# Patient Record
Sex: FEMALE | URBAN - METROPOLITAN AREA
[De-identification: names, ages, dates, MRNs, and addresses within clinical notes are randomized per-mention and may not be internally consistent; named-entity substitution may affect disease eponyms.]

---

## 2023-03-04 ENCOUNTER — OFFICE VISIT (OUTPATIENT)
Dept: URBAN - METROPOLITAN AREA CLINIC 17 | Facility: CLINIC | Age: 58
End: 2023-03-04
Payer: COMMERCIAL

## 2023-03-04 DIAGNOSIS — E11.9 TYPE 2 DIABETES MELLITUS W/O COMPLICATION: Primary | ICD-10-CM

## 2023-03-04 DIAGNOSIS — H43.392 OTHER VITREOUS OPACITIES, LEFT EYE: ICD-10-CM

## 2023-03-04 PROCEDURE — 99203 OFFICE O/P NEW LOW 30 MIN: CPT | Performed by: OPTOMETRIST

## 2023-03-04 ASSESSMENT — INTRAOCULAR PRESSURE
OD: 20
OS: 17

## 2023-03-04 NOTE — IMPRESSION/PLAN
Impression: Type 2 diabetes mellitus w/o complication: G88.2. Plan: Diabetes type II: no background retinopathy, no signs of neovascularization noted. Discussed ocular and systemic benefits of blood sugar control.  Return to clinic with Dr. Chel Kelly in one year for a diabetic eye exam.

## 2023-03-08 ENCOUNTER — OFFICE VISIT (OUTPATIENT)
Dept: URBAN - METROPOLITAN AREA CLINIC 17 | Facility: CLINIC | Age: 58
End: 2023-03-08
Payer: COMMERCIAL

## 2023-03-08 DIAGNOSIS — H52.4 PRESBYOPIA: Primary | ICD-10-CM

## 2023-03-08 PROCEDURE — 92310 CONTACT LENS FITTING OU: CPT

## 2023-03-08 ASSESSMENT — INTRAOCULAR PRESSURE
OS: 27
OD: 31

## 2023-03-08 ASSESSMENT — VISUAL ACUITY
OS: 20/25
OD: 20/30

## 2023-03-08 NOTE — IMPRESSION/PLAN
Impression: Presbyopia: H52.4.
-good fit, comfort, and vision with [employer] OD, OS Plan: Patient educated on all refractive findings. CLRx and SRx were finalized and released to patient. Discussed adaptation period of at least 3 weeks of full time wear with patient. Reminded good contact lens hygiene. Continue to monitor.

## 2024-05-15 ENCOUNTER — OFFICE VISIT (OUTPATIENT)
Dept: URBAN - METROPOLITAN AREA CLINIC 17 | Facility: CLINIC | Age: 59
End: 2024-05-15
Payer: COMMERCIAL

## 2024-05-15 DIAGNOSIS — H52.4 PRESBYOPIA: Primary | ICD-10-CM

## 2024-05-15 PROCEDURE — 92310 CONTACT LENS FITTING OU: CPT | Performed by: OPTOMETRIST

## 2024-05-15 PROCEDURE — 92012 INTRM OPH EXAM EST PATIENT: CPT | Performed by: OPTOMETRIST

## 2024-05-15 ASSESSMENT — VISUAL ACUITY
OS: 20/20
OD: 20/20

## 2024-05-15 ASSESSMENT — KERATOMETRY
OD: 41.88
OS: 41.63

## 2024-05-15 ASSESSMENT — INTRAOCULAR PRESSURE
OD: 25
OS: 25

## 2025-08-13 ENCOUNTER — OFFICE VISIT (OUTPATIENT)
Dept: URBAN - METROPOLITAN AREA CLINIC 17 | Facility: CLINIC | Age: 60
End: 2025-08-13
Payer: COMMERCIAL

## 2025-08-13 DIAGNOSIS — H35.411 LATTICE DEGENERATION OF RETINA, RIGHT EYE: ICD-10-CM

## 2025-08-13 DIAGNOSIS — H40.013 OPEN ANGLE WITH BORDERLINE FINDINGS, LOW RISK, BILATERAL: ICD-10-CM

## 2025-08-13 DIAGNOSIS — H25.13 AGE-RELATED NUCLEAR CATARACT, BILATERAL: ICD-10-CM

## 2025-08-13 DIAGNOSIS — E11.9 TYPE 2 DIABETES MELLITUS W/O COMPLICATION: Primary | ICD-10-CM

## 2025-08-13 DIAGNOSIS — H52.4 PRESBYOPIA: ICD-10-CM

## 2025-08-13 PROCEDURE — 92014 COMPRE OPH EXAM EST PT 1/>: CPT | Performed by: OPTOMETRIST

## 2025-08-13 PROCEDURE — 92310 CONTACT LENS FITTING OU: CPT | Performed by: OPTOMETRIST

## 2025-08-13 ASSESSMENT — KERATOMETRY
OD: 41.75
OS: 41.63

## 2025-08-13 ASSESSMENT — VISUAL ACUITY
OD: 20/30
OS: 20/30

## 2025-08-13 ASSESSMENT — INTRAOCULAR PRESSURE
OD: 19
OS: 18